# Patient Record
Sex: FEMALE | Race: WHITE | NOT HISPANIC OR LATINO | Employment: OTHER | URBAN - METROPOLITAN AREA
[De-identification: names, ages, dates, MRNs, and addresses within clinical notes are randomized per-mention and may not be internally consistent; named-entity substitution may affect disease eponyms.]

---

## 2017-08-25 ENCOUNTER — GENERIC CONVERSION - ENCOUNTER (OUTPATIENT)
Dept: OTHER | Facility: OTHER | Age: 77
End: 2017-08-25

## 2017-09-11 ENCOUNTER — GENERIC CONVERSION - ENCOUNTER (OUTPATIENT)
Dept: OTHER | Facility: OTHER | Age: 77
End: 2017-09-11

## 2018-07-06 DIAGNOSIS — I10 ESSENTIAL HYPERTENSION, BENIGN: Primary | ICD-10-CM

## 2018-07-06 RX ORDER — AMLODIPINE BESYLATE 2.5 MG/1
TABLET ORAL
Qty: 180 TABLET | Refills: 3 | Status: SHIPPED | OUTPATIENT
Start: 2018-07-06

## 2018-07-17 ENCOUNTER — TELEPHONE (OUTPATIENT)
Dept: FAMILY MEDICINE CLINIC | Facility: CLINIC | Age: 78
End: 2018-07-17

## 2018-07-17 DIAGNOSIS — I10 ESSENTIAL HYPERTENSION: Primary | ICD-10-CM

## 2018-07-17 DIAGNOSIS — E78.2 MIXED HYPERLIPIDEMIA: ICD-10-CM
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
## 2018-07-17 NOTE — TELEPHONE ENCOUNTER
Patient called and stated that she has had a couple of tick bites recently  She has complaints of myalgias and body aches headaches  She is also due for routine lab work  I have placed fasting blood work ordered including a Lyme antibody test with Western blot reflex  Please mail lab prescription to patient and call patient once it is mailed

## 2018-07-18 NOTE — TELEPHONE ENCOUNTER
Called pt to let them know labs already in the [facility] system can go to a [facility] lab in her area

## 2018-07-19 ENCOUNTER — TELEPHONE (OUTPATIENT)
Dept: FAMILY MEDICINE CLINIC | Facility: CLINIC | Age: 78
End: 2018-07-19

## 2018-07-19 NOTE — TELEPHONE ENCOUNTER
Patient left a voice message returning a call from [facility]    Please call patient back at [phone] regarding blood work

## 2019-03-20 RX ORDER — PHENAZOPYRIDINE HYDROCHLORIDE 100 MG/1
100 TABLET, FILM COATED ORAL 3 TIMES DAILY PRN
COMMUNITY
Start: 2018-10-17

## 2019-03-20 RX ORDER — AMOXICILLIN AND CLAVULANATE POTASSIUM 875; 125 MG/1; MG/1
875 TABLET, FILM COATED ORAL
COMMUNITY
Start: 2019-03-04

## 2019-03-20 RX ORDER — ENALAPRIL MALEATE 10 MG/1
1 TABLET ORAL DAILY
COMMUNITY

## 2019-03-20 RX ORDER — AMLODIPINE BESYLATE AND BENAZEPRIL HYDROCHLORIDE 2.5; 1 MG/1; MG/1
1 CAPSULE ORAL
COMMUNITY

## 2019-03-20 RX ORDER — HYDROCHLOROTHIAZIDE 12.5 MG/1
1 TABLET ORAL DAILY
COMMUNITY
Start: 2015-08-25

## 2019-03-20 RX ORDER — HYDROXYZINE HYDROCHLORIDE 25 MG/1
25 TABLET, FILM COATED ORAL
COMMUNITY
Start: 2017-05-08

## 2019-03-20 RX ORDER — HYDROCORTISONE ACETATE 0.5 %
CREAM (GRAM) TOPICAL
COMMUNITY

## 2019-03-20 RX ORDER — OXYBUTYNIN CHLORIDE 10 MG/1
TABLET, EXTENDED RELEASE ORAL
COMMUNITY
Start: 2015-03-14

## 2019-03-20 RX ORDER — VITAMIN B COMPLEX
TABLET ORAL
COMMUNITY

## 2019-03-20 RX ORDER — MELOXICAM 7.5 MG/1
7.5 TABLET ORAL
COMMUNITY
Start: 2019-03-06

## 2019-03-20 RX ORDER — OMEPRAZOLE 40 MG/1
CAPSULE, DELAYED RELEASE ORAL EVERY 24 HOURS
COMMUNITY
Start: 2018-01-25

## 2019-03-20 RX ORDER — FEXOFENADINE HCL 180 MG/1
180 TABLET ORAL
COMMUNITY

## 2019-03-20 RX ORDER — FEXOFENADINE HCL AND PSEUDOEPHEDRINE HCI 180; 240 MG/1; MG/1
TABLET, EXTENDED RELEASE ORAL
COMMUNITY

## 2019-03-20 RX ORDER — ALPRAZOLAM 0.5 MG/1
TABLET ORAL
COMMUNITY
Start: 2016-07-28

## 2019-03-20 RX ORDER — TOLTERODINE 4 MG/1
CAPSULE, EXTENDED RELEASE ORAL
COMMUNITY
Start: 2016-04-04

## 2019-03-20 RX ORDER — NYSTATIN 100000 U/G
OINTMENT TOPICAL
COMMUNITY
Start: 2019-03-06

## 2019-03-20 RX ORDER — FAMOTIDINE 40 MG/1
TABLET, FILM COATED ORAL EVERY 24 HOURS
COMMUNITY
Start: 2015-03-25

## 2019-03-20 RX ORDER — FLUTICASONE PROPIONATE 50 MCG
SPRAY, SUSPENSION (ML) NASAL
COMMUNITY
Start: 2015-04-08

## 2019-03-20 RX ORDER — FEXOFENADINE HCL 180 MG/1
TABLET ORAL EVERY 24 HOURS
COMMUNITY

## 2019-03-20 RX ORDER — NITROGLYCERIN 0.4 MG/1
TABLET SUBLINGUAL
COMMUNITY
Start: 2012-01-24

## 2019-03-20 RX ORDER — ATORVASTATIN CALCIUM 10 MG/1
TABLET, FILM COATED ORAL
COMMUNITY
Start: 2017-02-17

## 2019-03-20 RX ORDER — OMEPRAZOLE 20 MG/1
CAPSULE, DELAYED RELEASE ORAL
COMMUNITY
Start: 2012-10-15

## 2019-03-21 ENCOUNTER — TELEPHONE (OUTPATIENT)
Dept: CARDIOLOGY CLINIC | Facility: CLINIC | Age: 79
End: 2019-03-21

## 2019-03-22 ENCOUNTER — OFFICE VISIT (OUTPATIENT)
Dept: GASTROENTEROLOGY | Facility: CLINIC | Age: 79
End: 2019-03-22
Payer: MEDICARE

## 2019-03-22 VITALS
HEART RATE: 84 BPM | SYSTOLIC BLOOD PRESSURE: 130 MMHG | BODY MASS INDEX: 35.39 KG/M2 | DIASTOLIC BLOOD PRESSURE: 80 MMHG | WEIGHT: 199.8 LBS

## 2019-03-22 DIAGNOSIS — R10.32 LEFT LOWER QUADRANT PAIN: Primary | ICD-10-CM

## 2019-03-22 DIAGNOSIS — R19.4 CHANGE IN STOOL HABITS: ICD-10-CM

## 2019-03-22 PROCEDURE — 99203 OFFICE O/P NEW LOW 30 MIN: CPT | Performed by: PHYSICIAN ASSISTANT

## 2019-03-22 RX ORDER — ASCORBIC ACID 100 MG
TABLET,CHEWABLE ORAL EVERY 24 HOURS
COMMUNITY
Start: 2012-11-29

## 2019-03-22 RX ORDER — FEXOFENADINE HCL 180 MG/1
1 TABLET ORAL EVERY 24 HOURS
COMMUNITY

## 2019-03-22 RX ORDER — DIMENHYDRINATE 50 MG
TABLET ORAL EVERY 24 HOURS
COMMUNITY
Start: 2012-11-29

## 2019-03-22 RX ORDER — HEPARIN SODIUM 10000 [USP'U]/ML
10000 INJECTION, SOLUTION INTRAVENOUS; SUBCUTANEOUS
COMMUNITY
Start: 2019-03-21 | End: 2019-05-17

## 2019-03-22 NOTE — LETTER
March 22, 2019     Sandoval Byers MD  [address] [place] [address]    Patient: Nadege Tineo   YOB: 1940   Date of Visit: 3/22/2019       Dear Dr Pradip Caoeper: Thank you for referring Steph Negrete to me for evaluation  Below are my notes for this consultation  If you have questions, please do not hesitate to call me  I look forward to following your patient along with you  Sincerely,        Angel Mayer PA-C        CC: No Recipients  Jerson Haro  3/22/2019  9:45 AM  Sign at close encounter  [facility] - Outpatient Consultation  Nadege Tineo 66 y o  female MRN: [id number]  Encounter: [id number]          ASSESSMENT AND PLAN:      1  Left lower quadrant pain  2  Change in stool habits  - Will start fiber and Align    - Will start Imodium 1mg every am    - She has a follow up appt in May with Dr Hilda Kebede; she will keep this appointment  Further recommendations will be made at this appt and after she is healed from surgery  ______________________________________________________________________    HPI:    66year old female who is here with LLQ pain and change in bowel habits  She reports that this has worsened since her bladder prolapse  She is having surgery for this next Friday  She denies any melena or RB  Her last colonoscopy was in 2012 and was normal    Her weight is stable  Her appetite is good  She reports that everytime she goes to urinate she has a BM  REVIEW OF SYSTEMS:    CONSTITUTIONAL: Denies any fever, chills, rigors, and weight loss  HEENT: No earache or tinnitus  Denies hearing loss or visual disturbances  CARDIOVASCULAR: No chest pain or palpitations  RESPIRATORY: Denies any cough, hemoptysis, shortness of breath or dyspnea on exertion  GASTROINTESTINAL: As noted in the History of Present Illness  GENITOURINARY: No problems with urination  Denies any hematuria or dysuria    NEUROLOGIC: No dizziness or vertigo, denies headaches  MUSCULOSKELETAL: Denies any muscle or joint pain  SKIN: Denies skin rashes or itching  ENDOCRINE: Denies excessive thirst  Denies intolerance to heat or cold  PSYCHOSOCIAL: Denies depression or anxiety  Denies any recent memory loss         Historical Information   Past Medical History:   Diagnosis Date    Biliary dyskinesia 11/2012    Bladder disorder      Past Surgical History:   Procedure Laterality Date    COLONOSCOPY      done by Shelby Schwartz LAPAROSCOPIC CHOLECYSTECTOMY       Social History   Social History     Substance and Sexual Activity   Alcohol Use Never    Frequency: Never     Social History     Substance and Sexual Activity   Drug Use Never     Social History     Tobacco Use   Smoking Status Never Smoker   Smokeless Tobacco Never Used     Family History   Problem Relation Age of Onset    Alzheimer's disease Mother     Hyperlipidemia Father        Meds/Allergies       Current Outpatient Medications:     amLODIPine (NORVASC) 2 5 mg tablet    Ascorbic Acid (VITAMIN C) 100 MG CHEW    Aspirin (ASPIR-81 PO)    atorvastatin (LIPITOR) 10 mg tablet    Calcium Carb-Cholecalciferol 600-500 MG-UNIT CAPS    calcium carbonate 1250 MG capsule    coenzyme Q-10 100 MG capsule    Coenzyme Q10 (COQ10) 100 MG CAPS    enalapril (VASOTEC) 10 mg tablet    fexofenadine (ALLEGRA) 180 MG tablet    fexofenadine (ALLEGRA) 180 MG tablet    fluticasone (FLONASE) 50 mcg/act nasal spray    Heparin Sodium, Porcine, (HEPARIN, PORCINE,) 77333 UNIT/ML    methenamine-sodium biphosphonate (UROQUID) 500-500 MG per tablet    Multiple Vitamins-Minerals (MULTIVITAMIN ADULTS 50+ PO)    nitroglycerin (NITROSTAT) 0 4 mg SL tablet    nystatin (MYCOSTATIN) ointment    omeprazole (PriLOSEC) 20 mg delayed release capsule    pneumococcal 13-valent conjugate vaccine (PREVNAR-13)    tolterodine (DETROL LA) 4 mg 24 hr capsule    ALPRAZolam (XANAX) 0 5 mg tablet    amLODIPine-benazepril (LOTREL 2 5-10) 2 5-10 MG per capsule    amoxicillin-clavulanate (AUGMENTIN) 875-125 mg per tablet    aspirin 81 MG tablet    calcium polycarbophil (FIBERCON) 625 mg chewable tablet    conjugated estrogens (PREMARIN) vaginal cream    famotidine (PEPCID) 40 MG tablet    fexofenadine (ALLEGRA) 180 MG tablet    fexofenadine-pseudoephedrine (ALLEGRA-D 24) 180-240 MG per 24 hr tablet    Glucosamine-Chondroit-Vit C-Mn (GLUCOSAMINE 1500 COMPLEX) CAPS    hydrochlorothiazide (HYDRODIURIL) 12 5 mg tablet    hydrOXYzine HCL (ATARAX) 25 mg tablet    Lorcaserin HCl (BELVIQ) 10 MG TABS    Lorcaserin HCl 10 MG TABS    meloxicam (MOBIC) 7 5 mg tablet    omeprazole (PriLOSEC) 40 MG capsule    oxybutynin (DITROPAN-XL) 10 MG 24 hr tablet    pentosan polysulfate (ELMIRON) 100 mg capsule    phenazopyridine (PYRIDIUM) 100 mg tablet    triamcinolone 40 MG/ML SUSP    Allergies   Allergen Reactions    Macrobid [Nitrofurantoin] Anaphylaxis     Can't breath    Amoxicillin Rash    Sulfasalazine Vomiting    Sulfate Other (See Comments)     Other reaction(s): very ill           Objective     Blood pressure 130/80, pulse 84, weight 90 6 kg (199 lb 12 8 oz)  Body mass index is 35 39 kg/m²  PHYSICAL EXAM:      General Appearance:   Alert, cooperative, no distress   HEENT:   Normocephalic, atraumatic, anicteric      Neck:  Supple, symmetrical, trachea midline   Lungs:   Clear to auscultation bilaterally; no rales, rhonchi or wheezing; respirations unlabored    Heart[de-identified]   Regular rate and rhythm; no murmur, rub, or gallop  Abdomen:   Soft, non-tender, non-distended; normal bowel sounds; no masses, no organomegaly    Genitalia:   Deferred    Rectal:   Deferred    Extremities:  No cyanosis, clubbing or edema    Pulses:  2+ and symmetric    Skin:  No jaundice, rashes, or lesions    Lymph nodes:  No palpable cervical lymphadenopathy        Lab Results:   No visits with results within 1 Day(s) from this visit     Latest known visit with results is: No results found for any previous visit  Radiology Results:   No results found

## 2019-03-22 NOTE — PROGRESS NOTES
[facility] - Outpatient Consultation  Cally Titus 66 y o  female MRN: [id number]  Encounter: [id number]          ASSESSMENT AND PLAN:      1  Left lower quadrant pain  2  Change in stool habits  - Will start fiber and Align    - Will start Imodium 1mg every am    - She has a follow up appt in May with Dr Bisi Metz; she will keep this appointment  Further recommendations will be made at this appt and after she is healed from surgery  ______________________________________________________________________    HPI:    66year old female who is here with LLQ pain and change in bowel habits  She reports that this has worsened since her bladder prolapse  She is having surgery for this next Friday  She denies any melena or RB  Her last colonoscopy was in 2012 and was normal    Her weight is stable  Her appetite is good  She reports that everytime she goes to urinate she has a BM  REVIEW OF SYSTEMS:    CONSTITUTIONAL: Denies any fever, chills, rigors, and weight loss  HEENT: No earache or tinnitus  Denies hearing loss or visual disturbances  CARDIOVASCULAR: No chest pain or palpitations  RESPIRATORY: Denies any cough, hemoptysis, shortness of breath or dyspnea on exertion  GASTROINTESTINAL: As noted in the History of Present Illness  GENITOURINARY: No problems with urination  Denies any hematuria or dysuria  NEUROLOGIC: No dizziness or vertigo, denies headaches  MUSCULOSKELETAL: Denies any muscle or joint pain  SKIN: Denies skin rashes or itching  ENDOCRINE: Denies excessive thirst  Denies intolerance to heat or cold  PSYCHOSOCIAL: Denies depression or anxiety  Denies any recent memory loss         Historical Information   Past Medical History:   Diagnosis Date    Biliary dyskinesia 11/2012    Bladder disorder      Past Surgical History:   Procedure Laterality Date    COLONOSCOPY      done by Laith 17       Social History   Social History Substance and Sexual Activity   Alcohol Use Never    Frequency: Never     Social History     Substance and Sexual Activity   Drug Use Never     Social History     Tobacco Use   Smoking Status Never Smoker   Smokeless Tobacco Never Used     Family History   Problem Relation Age of Onset    Alzheimer's disease Mother     Hyperlipidemia Father        Meds/Allergies       Current Outpatient Medications:     amLODIPine (NORVASC) 2 5 mg tablet    Ascorbic Acid (VITAMIN C) 100 MG CHEW    Aspirin (ASPIR-81 PO)    atorvastatin (LIPITOR) 10 mg tablet    Calcium Carb-Cholecalciferol 600-500 MG-UNIT CAPS    calcium carbonate 1250 MG capsule    coenzyme Q-10 100 MG capsule    Coenzyme Q10 (COQ10) 100 MG CAPS    enalapril (VASOTEC) 10 mg tablet    fexofenadine (ALLEGRA) 180 MG tablet    fexofenadine (ALLEGRA) 180 MG tablet    fluticasone (FLONASE) 50 mcg/act nasal spray    Heparin Sodium, Porcine, (HEPARIN, PORCINE,) 83065 UNIT/ML    methenamine-sodium biphosphonate (UROQUID) 500-500 MG per tablet    Multiple Vitamins-Minerals (MULTIVITAMIN ADULTS 50+ PO)    nitroglycerin (NITROSTAT) 0 4 mg SL tablet    nystatin (MYCOSTATIN) ointment    omeprazole (PriLOSEC) 20 mg delayed release capsule    pneumococcal 13-valent conjugate vaccine (PREVNAR-13)    tolterodine (DETROL LA) 4 mg 24 hr capsule    ALPRAZolam (XANAX) 0 5 mg tablet    amLODIPine-benazepril (LOTREL 2 5-10) 2 5-10 MG per capsule    amoxicillin-clavulanate (AUGMENTIN) 875-125 mg per tablet    aspirin 81 MG tablet    calcium polycarbophil (FIBERCON) 625 mg chewable tablet    conjugated estrogens (PREMARIN) vaginal cream    famotidine (PEPCID) 40 MG tablet    fexofenadine (ALLEGRA) 180 MG tablet    fexofenadine-pseudoephedrine (ALLEGRA-D 24) 180-240 MG per 24 hr tablet    Glucosamine-Chondroit-Vit C-Mn (GLUCOSAMINE 1500 COMPLEX) CAPS    hydrochlorothiazide (HYDRODIURIL) 12 5 mg tablet    hydrOXYzine HCL (ATARAX) 25 mg tablet   Lorcaserin HCl (BELVIQ) 10 MG TABS    Lorcaserin HCl 10 MG TABS    meloxicam (MOBIC) 7 5 mg tablet    omeprazole (PriLOSEC) 40 MG capsule    oxybutynin (DITROPAN-XL) 10 MG 24 hr tablet    pentosan polysulfate (ELMIRON) 100 mg capsule    phenazopyridine (PYRIDIUM) 100 mg tablet    triamcinolone 40 MG/ML SUSP    Allergies   Allergen Reactions    Macrobid [Nitrofurantoin] Anaphylaxis     Can't breath    Amoxicillin Rash    Sulfasalazine Vomiting    Sulfate Other (See Comments)     Other reaction(s): very ill           Objective     Blood pressure 130/80, pulse 84, weight 90 6 kg (199 lb 12 8 oz)  Body mass index is 35 39 kg/m²  PHYSICAL EXAM:      General Appearance:   Alert, cooperative, no distress   HEENT:   Normocephalic, atraumatic, anicteric      Neck:  Supple, symmetrical, trachea midline   Lungs:   Clear to auscultation bilaterally; no rales, rhonchi or wheezing; respirations unlabored    Heart[de-identified]   Regular rate and rhythm; no murmur, rub, or gallop  Abdomen:   Soft, non-tender, non-distended; normal bowel sounds; no masses, no organomegaly    Genitalia:   Deferred    Rectal:   Deferred    Extremities:  No cyanosis, clubbing or edema    Pulses:  2+ and symmetric    Skin:  No jaundice, rashes, or lesions    Lymph nodes:  No palpable cervical lymphadenopathy        Lab Results:   No visits with results within 1 Day(s) from this visit  Latest known visit with results is:   No results found for any previous visit  Radiology Results:   No results found

## 2019-03-22 NOTE — PATIENT INSTRUCTIONS
Abdominal Pain   WHAT YOU NEED TO KNOW:   Abdominal pain can be dull, achy, or sharp  You may have pain in one area of your abdomen, or in your entire abdomen  Your pain may be caused by a condition such as constipation, food sensitivity or poisoning, infection, or a blockage  Abdominal pain can also be from a hernia, appendicitis, or an ulcer  Liver, gallbladder, or kidney conditions can also cause abdominal pain  The cause of your abdominal pain may be unknown  DISCHARGE INSTRUCTIONS:   Return to the emergency department if:   · You have new chest pain or shortness of breath  · You have pulsing pain in your upper abdomen or lower back that suddenly becomes constant  · Your pain is in the right lower abdominal area and worsens with movement  · You have a fever over 100 4°F (38°C) or shaking chills  · You are vomiting and cannot keep food or liquids down  · Your pain does not improve or gets worse over the next 8 to 12 hours  · You see blood in your vomit or bowel movements, or they look black and tarry  · Your skin or the whites of your eyes turn yellow  · You are a woman and have a large amount of vaginal bleeding that is not your monthly period  Contact your healthcare provider if:   · You have pain in your lower back  · You are a man and have pain in your testicles  · You have pain when you urinate  · You have questions or concerns about your condition or care  Follow up with your healthcare provider within 24 hours or as directed:  Write down your questions so you remember to ask them during your visits  Medicines:   · Medicines  may be given to calm your stomach and prevent vomiting or to decrease pain  Ask how to take pain medicine safely  · Take your medicine as directed  Contact your healthcare provider if you think your medicine is not helping or if you have side effects  Tell him of her if you are allergic to any medicine   Keep a list of the medicines, vitamins, and herbs you take  Include the amounts, and when and why you take them  Bring the list or the pill bottles to follow-up visits  Carry your medicine list with you in case of an emergency  © 2017 [address] [employer] Information is for End User's use only and may not be sold, redistributed or otherwise used for commercial purposes  All illustrations and images included in CareNotes® are the copyrighted property of A D [employer] M [employer] Inc  or [employer]  The above information is an  only  It is not intended as medical advice for individual conditions or treatments  Talk to your doctor, nurse or pharmacist before following any medical regimen to see if it is safe and effective for you

## 2019-05-17 ENCOUNTER — OFFICE VISIT (OUTPATIENT)
Dept: GASTROENTEROLOGY | Facility: CLINIC | Age: 79
End: 2019-05-17
Payer: MEDICARE

## 2019-05-17 VITALS
WEIGHT: 202.25 LBS | SYSTOLIC BLOOD PRESSURE: 140 MMHG | DIASTOLIC BLOOD PRESSURE: 90 MMHG | HEART RATE: 76 BPM | BODY MASS INDEX: 35.83 KG/M2

## 2019-05-17 DIAGNOSIS — K58.0 IRRITABLE BOWEL SYNDROME WITH DIARRHEA: Primary | ICD-10-CM

## 2019-05-17 PROCEDURE — 99214 OFFICE O/P EST MOD 30 MIN: CPT | Performed by: INTERNAL MEDICINE

## 2023-07-26 ENCOUNTER — TELEPHONE (OUTPATIENT)
Dept: DERMATOLOGY | Facility: CLINIC | Age: 83
End: 2023-07-26

## 2023-07-26 NOTE — TELEPHONE ENCOUNTER
NP calling for apt, informed fully booked until end of year, unable to add to waitlist (pt doesn't have mychart), pt wcb in August about scheduling in 2024. Pt will also contact [facility].

## 2024-02-14 ENCOUNTER — HOSPITAL ENCOUNTER (OUTPATIENT)
Dept: MRI IMAGING | Facility: HOSPITAL | Age: 84
Discharge: HOME | End: 2024-02-14
Payer: COMMERCIAL

## 2024-02-14 ENCOUNTER — HOSPITAL ENCOUNTER (OUTPATIENT)
Dept: RADIOLOGY | Facility: HOSPITAL | Age: 84
Discharge: HOME | End: 2024-02-14

## 2024-02-14 DIAGNOSIS — M54.50 LUMBAR PAIN: ICD-10-CM

## 2024-02-14 PROCEDURE — 72148 MRI LUMBAR SPINE W/O DYE: CPT

## 2024-02-14 PROCEDURE — G1004 CDSM NDSC: HCPCS

## 2024-06-12 ENCOUNTER — OFFICE VISIT (OUTPATIENT)
Dept: GASTROENTEROLOGY | Facility: CLINIC | Age: 84
End: 2024-06-12
Payer: COMMERCIAL

## 2024-06-12 VITALS
SYSTOLIC BLOOD PRESSURE: 118 MMHG | OXYGEN SATURATION: 98 % | BODY MASS INDEX: 30.87 KG/M2 | DIASTOLIC BLOOD PRESSURE: 68 MMHG | WEIGHT: 180.8 LBS | HEART RATE: 92 BPM | TEMPERATURE: 98.2 F | HEIGHT: 64 IN

## 2024-06-12 DIAGNOSIS — K21.9 GASTROESOPHAGEAL REFLUX DISEASE, UNSPECIFIED WHETHER ESOPHAGITIS PRESENT: ICD-10-CM

## 2024-06-12 DIAGNOSIS — R07.9 CHEST PAIN, UNSPECIFIED TYPE: Primary | ICD-10-CM

## 2024-06-12 PROCEDURE — 99203 OFFICE O/P NEW LOW 30 MIN: CPT | Performed by: PHYSICIAN ASSISTANT

## 2024-06-12 RX ORDER — CHLORTHALIDONE 25 MG/1
25 TABLET ORAL DAILY
COMMUNITY

## 2024-06-12 RX ORDER — SUCRALFATE 1 G/1
TABLET ORAL
Qty: 120 TABLET | Refills: 0 | Status: SHIPPED | OUTPATIENT
Start: 2024-06-12

## 2024-06-12 NOTE — PROGRESS NOTES
[facility] - Outpatient Consultation  Colleen Ng 83 y.o. female MRN: [id number]  Encounter: [id number]          ASSESSMENT AND PLAN:      1. Chest pain, unspecified type  2. 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  - Pepcid 40 mg BID has been of minimal help  - On carafate since 6/6 with some reduction in her chest pain; advised her to dissolve the tablets in water first to be more effective at coating the esophagus, we will use this short term over the next 2-3 weeks  - Schedule EGD for further evaluation; degree severity of esophagitis, evaluate for hiatal hernia, rule out Hickman's to help determine treatment course and avoid PPI use  - Anti-reflux diet      ______________________________________________________________________    HPI:  Colleen is a 82 yo F with a PMH of HTN, HLD, 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  She reports that she had a cardiac workup with an EKG and lab work while she was there which was negative.  She did dealing with similar symptoms and lesser severity since December when she was taken off of her PPI by her nephrologist.  She sees nephrologist with [facility] and has had recurrent SIENNA's over the past 2 years and having a biopsy of her kidney in December which showed acute on chronic interstitial nephritis which they attributed to her chronic PPI use.  Since stopping her PPI in December, her renal function has improved and she is been utilizing Pepcid 40 mg twice daily without good control of her symptoms.  She reports almost daily heartburn and regurgitation.  There is  occasional dysphagia to foods but most of the time she swallows without any problems.  She denies any nausea or vomiting.  She believes she had an endoscopy in 2012 when she had her last colonoscopy and she believes it was a relatively normal exam.  She was on omeprazole for many years prior to it being stopped in December.      REVIEW OF SYSTEMS:    CONSTITUTIONAL: Denies any fever, chills, rigors, and weight loss.  HEENT: No earache or tinnitus. Denies hearing loss or visual disturbances.  CARDIOVASCULAR: No chest pain or palpitations.   RESPIRATORY: Denies any cough, hemoptysis, shortness of breath or dyspnea on exertion.  GASTROINTESTINAL: As noted in the History of Present Illness.   GENITOURINARY: No problems with urination. Denies any hematuria or dysuria.  NEUROLOGIC: No dizziness or vertigo, denies headaches.   MUSCULOSKELETAL: Denies any muscle or joint pain.   SKIN: Denies skin rashes or itching.   ENDOCRINE: Denies excessive thirst. Denies intolerance to heat or cold.  PSYCHOSOCIAL: Denies depression or anxiety. Denies any recent memory loss.       Historical Information   Past Medical History:   Diagnosis Date    Biliary dyskinesia 11/2012    Bladder disorder     Hyperlipidemia     Hypertension     Interstitial cystitis     Obesity (BMI 30.0-34.9)      Past Surgical History:   Procedure Laterality Date    BLADDER REPAIR  04/29/2019    COLONOSCOPY      done by Phuc ALMODOVAR  2015    LAPAROSCOPIC CHOLECYSTECTOMY      US GUIDED KIDNEY BIOPSY  12/27/2023    US GUIDED KIDNEY BIOPSY  7/25/2022     Social History   Social History     Substance and Sexual Activity   Alcohol Use Never     Social History     Substance and Sexual Activity   Drug Use Never     Social History     Tobacco Use   Smoking Status Never   Smokeless Tobacco Never     Family History   Problem Relation Age of Onset    Alzheimer's disease Mother     Hyperlipidemia Father        Meds/Allergies       Current Outpatient Medications:     " amoxicillin-clavulanate (AUGMENTIN) 875-125 mg per tablet    apixaban (Eliquis) 2.5 mg    atorvastatin (LIPITOR) 10 mg tablet    chlorthalidone 25 mg tablet    famotidine (PEPCID) 40 MG tablet    fexofenadine (ALLEGRA) 180 MG tablet    Multiple Vitamins-Minerals (MULTIVITAMIN ADULTS 50+ PO)    tolterodine (DETROL LA) 4 mg 24 hr capsule    calcium polycarbophil (FIBERCON) 625 mg chewable tablet    coenzyme Q-10 100 MG capsule    Coenzyme Q10 (COQ10) 100 MG CAPS    conjugated estrogens (PREMARIN) vaginal cream    enalapril (VASOTEC) 10 mg tablet    Allergies   Allergen Reactions    Macrobid [Nitrofurantoin] Anaphylaxis     Can't breath    Amoxicillin Rash    Sulfasalazine Vomiting    Sulfate Other (See Comments)     Other reaction(s): very ill           Objective     Blood pressure 118/68, pulse 92, temperature 98.2 °F (36.8 °C), temperature source Temporal, height 5' 4\" (1.626 m), weight 82 kg (180 lb 12.8 oz), SpO2 98%. Body mass index is 31.03 kg/m².        PHYSICAL EXAM:      General Appearance:   Alert, cooperative, no distress   HEENT:   Normocephalic, atraumatic, anicteric.     Neck:  Supple, symmetrical, trachea midline   Lungs:   Clear to auscultation bilaterally; no rales, rhonchi or wheezing; respirations unlabored    Heart::   Regular rate and rhythm; no murmur, rub, or gallop.   Abdomen:   Soft, non-tender, non-distended; normal bowel sounds; no masses, no organomegaly    Genitalia:   Deferred    Rectal:   Deferred    Extremities:  No cyanosis, clubbing or edema    Pulses:  2+ and symmetric    Skin:  No jaundice, rashes, or lesions    Lymph nodes:  No palpable cervical lymphadenopathy        Lab Results:   No visits with results within 1 Day(s) from this visit.   Latest known visit with results is:   No results found for any previous visit.         Radiology Results:   XR chest portable    Result Date: 6/6/2024  Narrative: History: Chest pain Comparison: 2/16/2024 Technique: A single frontal view of the " chest is submitted for evaluation. Findings: External leads overlie the chest. Cardiac pacer in stable position. No focal consolidation identified. No pneumothorax is identified. The cardiac silhouette is within normal limits in appearance .       Impression: Impression: No acute cardiopulmonary disease identified. Workstation:[id number]

## 2024-06-12 NOTE — PATIENT INSTRUCTIONS
Scheduled date of EGD(as of today):6/27/24  Physician performing EGD:Phuc  Location of EGD:[facility]  Instructions reviewed with patient by:Naye WIN  Clearances:  none

## 2024-07-01 ENCOUNTER — TELEPHONE (OUTPATIENT)
Age: 84
End: 2024-07-01

## 2024-07-01 NOTE — TELEPHONE ENCOUNTER
Patient calling to reschedule her EGD. EGD has been rescheduled for 7/17/24 with  at [facility]. Patient has her prep instructions and is aware of Eliquis hold.

## 2024-07-17 ENCOUNTER — ANESTHESIA EVENT (OUTPATIENT)
Dept: GASTROENTEROLOGY | Facility: HOSPITAL | Age: 84
End: 2024-07-17

## 2024-07-17 ENCOUNTER — HOSPITAL ENCOUNTER (OUTPATIENT)
Dept: GASTROENTEROLOGY | Facility: HOSPITAL | Age: 84
Setting detail: OUTPATIENT SURGERY
Discharge: HOME | End: 2024-07-17
Admitting: INTERNAL MEDICINE
Payer: COMMERCIAL

## 2024-07-17 ENCOUNTER — ANESTHESIA (OUTPATIENT)
Dept: GASTROENTEROLOGY | Facility: HOSPITAL | Age: 84
End: 2024-07-17

## 2024-07-17 VITALS
TEMPERATURE: 98 F | DIASTOLIC BLOOD PRESSURE: 88 MMHG | HEART RATE: 72 BPM | OXYGEN SATURATION: 99 % | WEIGHT: 178.79 LBS | RESPIRATION RATE: 20 BRPM | BODY MASS INDEX: 30.52 KG/M2 | HEIGHT: 64 IN | SYSTOLIC BLOOD PRESSURE: 177 MMHG

## 2024-07-17 DIAGNOSIS — K21.9 GASTROESOPHAGEAL REFLUX DISEASE, UNSPECIFIED WHETHER ESOPHAGITIS PRESENT: ICD-10-CM

## 2024-07-17 DIAGNOSIS — R07.9 CHEST PAIN, UNSPECIFIED TYPE: ICD-10-CM

## 2024-07-17 PROCEDURE — 88342 IMHCHEM/IMCYTCHM 1ST ANTB: CPT | Performed by: PATHOLOGY

## 2024-07-17 PROCEDURE — 88305 TISSUE EXAM BY PATHOLOGIST: CPT | Performed by: PATHOLOGY

## 2024-07-17 PROCEDURE — 88313 SPECIAL STAINS GROUP 2: CPT | Performed by: PATHOLOGY

## 2024-07-17 PROCEDURE — 43239 EGD BIOPSY SINGLE/MULTIPLE: CPT | Performed by: INTERNAL MEDICINE

## 2024-07-17 PROCEDURE — 88341 IMHCHEM/IMCYTCHM EA ADD ANTB: CPT | Performed by: PATHOLOGY

## 2024-07-17 RX ORDER — PANTOPRAZOLE SODIUM 40 MG/1
40 TABLET, DELAYED RELEASE ORAL DAILY
Qty: 30 TABLET | Refills: 2 | Status: SHIPPED | OUTPATIENT
Start: 2024-07-17

## 2024-07-17 RX ORDER — SODIUM CHLORIDE, SODIUM LACTATE, POTASSIUM CHLORIDE, CALCIUM CHLORIDE 600; 310; 30; 20 MG/100ML; MG/100ML; MG/100ML; MG/100ML
INJECTION, SOLUTION INTRAVENOUS CONTINUOUS PRN
Status: DISCONTINUED | OUTPATIENT
Start: 2024-07-17 | End: 2024-07-17

## 2024-07-17 RX ORDER — PROPOFOL 10 MG/ML
INJECTION, EMULSION INTRAVENOUS AS NEEDED
Status: DISCONTINUED | OUTPATIENT
Start: 2024-07-17 | End: 2024-07-17

## 2024-07-17 RX ORDER — LIDOCAINE HYDROCHLORIDE 20 MG/ML
INJECTION, SOLUTION EPIDURAL; INFILTRATION; INTRACAUDAL; PERINEURAL AS NEEDED
Status: DISCONTINUED | OUTPATIENT
Start: 2024-07-17 | End: 2024-07-17

## 2024-07-17 RX ADMIN — SODIUM CHLORIDE, SODIUM LACTATE, POTASSIUM CHLORIDE, AND CALCIUM CHLORIDE: .6; .31; .03; .02 INJECTION, SOLUTION INTRAVENOUS at 10:22

## 2024-07-17 RX ADMIN — LIDOCAINE HYDROCHLORIDE 100 MG: 20 INJECTION, SOLUTION EPIDURAL; INFILTRATION; INTRACAUDAL; PERINEURAL at 10:22

## 2024-07-17 RX ADMIN — PROPOFOL 100 MG: 10 INJECTION, EMULSION INTRAVENOUS at 10:22

## 2024-07-17 NOTE — ANESTHESIA POSTPROCEDURE EVALUATION
Post-Op Assessment Note    CV Status:  Stable    Pain management: adequate       Mental Status:  Alert and awake   Hydration Status:  Euvolemic   PONV Controlled:  Controlled   Airway Patency:  Patent     Post Op Vitals Reviewed: Yes                  /82 (07/17/24 1028)    Temp 98 °F (36.7 °C) (07/17/24 1028)    Pulse 76 (07/17/24 1028)   Resp 16 (07/17/24 1028)    SpO2 94 % (07/17/24 1028)

## 2024-07-17 NOTE — ANESTHESIA PREPROCEDURE EVALUATION
Procedure:  EGD    PM in place, placed 10/2023 for intermittent CHB  Device check 2/2024  [employer] remote MDT pacemaker transmission from [facility] ED; Presenting rhythm=AS- w/apparent capture; Multiple VHR/AHR episodes from 2/14/24-2/16/24, EGM's appearing as SVT, longest duration x~7 mins, max ventricular rate 171 bpm; 2% A-paced & 77% V-paced; Appropriately functioning device.     LBBB    Atrial tachycardia on diltiazem  CKD  Last dose of eliquis Saturday for DVT in LUE 1/2024    EKG 6/24  Atrial-sensed ventricular-paced rhythm with occasional Premature ventricular complexes     Relevant Problems   CARDIO   (+) Benign essential hypertension   (+) Hyperlipidemia        Physical Exam    Airway    Mallampati score: III  TM Distance: >3 FB  Neck ROM: full     Dental    upper dentures and lower dentures    Cardiovascular  Cardiovascular exam normal    Pulmonary  Pulmonary exam normal     Other Findings  post-pubertal.      Anesthesia Plan  ASA Score- 3     Anesthesia Type- IV sedation with anesthesia with ASA Monitors.         Additional Monitors:     Airway Plan:            Plan Factors-    Chart reviewed.   Existing labs reviewed. Patient summary reviewed.    Patient is not a current smoker.              Induction- intravenous.    Postoperative Plan-         Informed Consent- Anesthetic plan and risks discussed with patient.  I personally reviewed this patient with the CRNA. Discussed and agreed on the Anesthesia Plan with the CRNA..

## 2024-07-17 NOTE — H&P
History and Physical -  [facility]  Colleen Ng 83 y.o. female MRN: [id number]                  HPI: Colleen Ng is a 83 y.o. year old female who presents for endoscopy for evaluation of GERD      REVIEW OF SYSTEMS: Per the HPI, and otherwise unremarkable.    Historical Information   Past Medical History:   Diagnosis Date    Biliary dyskinesia 11/2012    Bladder disorder     Hyperlipidemia     Hypertension     Interstitial cystitis     Obesity (BMI 30.0-34.9)      Past Surgical History:   Procedure Laterality Date    BLADDER REPAIR  04/29/2019    CARDIAC PACEMAKER PLACEMENT      CATARACT EXTRACTION  02/2024    CATARACT EXTRACTION  03/2024    COLONOSCOPY      done by Phuc    HYSTERECTOMY  2015    LAPAROSCOPIC CHOLECYSTECTOMY      US GUIDED KIDNEY BIOPSY  12/27/2023    US GUIDED KIDNEY BIOPSY  07/25/2022     Social History   Social History     Substance and Sexual Activity   Alcohol Use Never     Social History     Substance and Sexual Activity   Drug Use Never     Social History     Tobacco Use   Smoking Status Never   Smokeless Tobacco Never     Family History   Problem Relation Age of Onset    Alzheimer's disease Mother     Hyperlipidemia Father        Meds/Allergies     Not in a hospital admission.    Allergies   Allergen Reactions    Angiotensin Receptor Blockers Other (See Comments)     Reported by dtr   Recurrent SIENNA   Renal failure    Reported by dtr    Renal failure    Recurrent SIENNA    Macrobid [Nitrofurantoin] Anaphylaxis     Can't breath    Proton Pump Inhibitors Other (See Comments)     INTERFERES WITH KIDNEYS CANNOT TAKE ANYMORE    Acute Interstitial Nephritis    Allergic interstitial nephritis    Amoxicillin Rash    Sulfa Antibiotics GI Intolerance, Vomiting and Other (See Comments)     Very ill    Sulfasalazine Vomiting    Ace Inhibitors Other (See Comments)     Renal failure     Renal failure       Renal failure    Spironolactone Other (See Comments)     Renal Failure- Pt  "taking- states ok per MD    Sulfate Other (See Comments)     Other reaction(s): very ill       Objective     Blood pressure (!) 179/88, pulse 87, temperature 97.5 °F (36.4 °C), temperature source Temporal, resp. rate 16, height 5' 4\" (1.626 m), weight 81.1 kg (178 lb 12.7 oz), SpO2 97%.      PHYSICAL EXAM    BP (!) 179/88   Pulse 87   Temp 97.5 °F (36.4 °C) (Temporal)   Resp 16   Ht 5' 4\" (1.626 m)   Wt 81.1 kg (178 lb 12.7 oz)   SpO2 97%   BMI 30.69 kg/m²       Gen: NAD  CV: RRR  CHEST: Clear  ABD: soft, NT/ND  EXT: no edema      ASSESSMENT/PLAN:  This is a 83 y.o. year old female here for egd, and she is stable and optimized for her procedure.        "

## 2024-07-18 ENCOUNTER — TELEPHONE (OUTPATIENT)
Age: 84
End: 2024-07-18

## 2024-07-18 NOTE — TELEPHONE ENCOUNTER
Spoke with Dr. Friend, would like to do PPI for 6-8 weeks to heal the erosive esophagitis and then try transitioning back to pepcid.  Spoke with Colleen and she is unsure of what to do, wanted me to call her daughter Urmila to discuss further.  Spoke with Urmila, she would like her mom to try to PPI and do bloodwork every 1-2 weeks to monitor her renal function. We will plan to use the PPI, monitor renal function, and then try to transition to Pepcid and/or reflux gourmet after the PPI course to see how she does.

## 2024-07-18 NOTE — TELEPHONE ENCOUNTER
Patients GI provider:  JAZMÍN Cisneros    Number to return call: Dr Perez at [phone]     Reason for call: Dr Perez from [facility] called and requested a call back to speak with JAZMÍN Akins regarding patients case please reach out at [phone] thank you     Scheduled procedure/appointment date if applicable: n/a

## 2024-07-18 NOTE — TELEPHONE ENCOUNTER
Spoke with Dr. Perez [phone], he just reiterated her acute on chronic interstitial nephritis which he feels is from PPI use and he would really like to avoid PPIs for her but if there is no other option, she needs to understand the risks and would need weekly or biweekly labs and a low dose steroid.   I will touch base with Colleen and see how she is doing medication wise. Gave carafate last week, could consider reflux gourmet as a trial.

## 2024-07-22 PROCEDURE — 88342 IMHCHEM/IMCYTCHM 1ST ANTB: CPT | Performed by: PATHOLOGY

## 2024-07-22 PROCEDURE — 88341 IMHCHEM/IMCYTCHM EA ADD ANTB: CPT | Performed by: PATHOLOGY

## 2024-07-22 PROCEDURE — 88313 SPECIAL STAINS GROUP 2: CPT | Performed by: PATHOLOGY

## 2024-07-22 PROCEDURE — 88305 TISSUE EXAM BY PATHOLOGIST: CPT | Performed by: PATHOLOGY

## 2024-07-23 ENCOUNTER — TELEPHONE (OUTPATIENT)
Dept: GASTROENTEROLOGY | Facility: CLINIC | Age: 84
End: 2024-07-23

## 2024-07-23 NOTE — TELEPHONE ENCOUNTER
----- Message from Shantanu Friend MD sent at 7/23/2024  7:46 AM EDT -----  Please tell her the biopsy of her small intestine and stomach were normal.

## 2024-10-18 ENCOUNTER — TELEPHONE (OUTPATIENT)
Age: 84
End: 2024-10-18

## 2024-10-18 NOTE — TELEPHONE ENCOUNTER
Patients GI provider:  Dr. Friend     Number to return call: [phone]    Reason for call: Patient calling as she states that her nephrologist was going to reach out to  as patient is completed with the Protonix. Patient is asking for an alternative medication to be prescribed as she states she is allergic to the Protonix. Patient would like alternative medication to be sent to [facility].     Scheduled procedure/appointment date if applicable: N/A

## 2024-10-21 NOTE — TELEPHONE ENCOUNTER
Called patient and got VM . LMOM asking patient to please return call to office to let us know if she is still taking famotidine & why she feels she is allergic to the protonix as per Rina. Left office  so she can return call to office . Left office # as well